# Patient Record
Sex: FEMALE | Race: WHITE | NOT HISPANIC OR LATINO
[De-identification: names, ages, dates, MRNs, and addresses within clinical notes are randomized per-mention and may not be internally consistent; named-entity substitution may affect disease eponyms.]

---

## 2017-03-06 ENCOUNTER — FORM ENCOUNTER (OUTPATIENT)
Age: 67
End: 2017-03-06

## 2017-03-07 ENCOUNTER — FORM ENCOUNTER (OUTPATIENT)
Age: 67
End: 2017-03-07

## 2017-03-28 ENCOUNTER — FORM ENCOUNTER (OUTPATIENT)
Age: 67
End: 2017-03-28

## 2017-04-04 ENCOUNTER — FORM ENCOUNTER (OUTPATIENT)
Age: 67
End: 2017-04-04

## 2017-06-08 ENCOUNTER — FORM ENCOUNTER (OUTPATIENT)
Age: 67
End: 2017-06-08

## 2017-08-03 ENCOUNTER — FORM ENCOUNTER (OUTPATIENT)
Age: 67
End: 2017-08-03

## 2017-08-08 ENCOUNTER — FORM ENCOUNTER (OUTPATIENT)
Age: 67
End: 2017-08-08

## 2017-08-24 ENCOUNTER — FORM ENCOUNTER (OUTPATIENT)
Age: 67
End: 2017-08-24

## 2017-11-08 ENCOUNTER — FORM ENCOUNTER (OUTPATIENT)
Age: 67
End: 2017-11-08

## 2018-03-14 ENCOUNTER — FORM ENCOUNTER (OUTPATIENT)
Age: 68
End: 2018-03-14

## 2018-07-25 PROBLEM — Z00.00 ENCOUNTER FOR PREVENTIVE HEALTH EXAMINATION: Status: ACTIVE | Noted: 2018-07-25

## 2018-07-30 ENCOUNTER — RECORD ABSTRACTING (OUTPATIENT)
Age: 68
End: 2018-07-30

## 2018-07-30 DIAGNOSIS — Z92.89 PERSONAL HISTORY OF OTHER MEDICAL TREATMENT: ICD-10-CM

## 2018-07-30 DIAGNOSIS — Z87.891 PERSONAL HISTORY OF NICOTINE DEPENDENCE: ICD-10-CM

## 2018-07-30 DIAGNOSIS — Z87.19 PERSONAL HISTORY OF OTHER DISEASES OF THE DIGESTIVE SYSTEM: ICD-10-CM

## 2018-07-30 DIAGNOSIS — Z63.4 DISAPPEARANCE AND DEATH OF FAMILY MEMBER: ICD-10-CM

## 2018-07-30 DIAGNOSIS — Z86.19 PERSONAL HISTORY OF OTHER INFECTIOUS AND PARASITIC DISEASES: ICD-10-CM

## 2018-07-30 DIAGNOSIS — Z87.42 PERSONAL HISTORY OF OTHER DISEASES OF THE FEMALE GENITAL TRACT: ICD-10-CM

## 2018-07-30 DIAGNOSIS — J44.9 CHRONIC OBSTRUCTIVE PULMONARY DISEASE, UNSPECIFIED: ICD-10-CM

## 2018-07-30 DIAGNOSIS — Z83.3 FAMILY HISTORY OF DIABETES MELLITUS: ICD-10-CM

## 2018-07-30 DIAGNOSIS — Z98.890 OTHER SPECIFIED POSTPROCEDURAL STATES: ICD-10-CM

## 2018-07-30 DIAGNOSIS — Z86.79 PERSONAL HISTORY OF OTHER DISEASES OF THE CIRCULATORY SYSTEM: ICD-10-CM

## 2018-07-30 DIAGNOSIS — Z78.9 OTHER SPECIFIED HEALTH STATUS: ICD-10-CM

## 2018-07-30 DIAGNOSIS — Z80.49 FAMILY HISTORY OF MALIGNANT NEOPLASM OF OTHER GENITAL ORGANS: ICD-10-CM

## 2018-07-30 RX ORDER — ASPIRIN 81 MG
81 TABLET, DELAYED RELEASE (ENTERIC COATED) ORAL
Refills: 0 | Status: ACTIVE | COMMUNITY

## 2018-07-30 RX ORDER — CLOPIDOGREL 75 MG/1
75 TABLET, FILM COATED ORAL
Refills: 0 | Status: ACTIVE | COMMUNITY

## 2018-07-30 SDOH — SOCIAL STABILITY - SOCIAL INSECURITY: DISSAPEARANCE AND DEATH OF FAMILY MEMBER: Z63.4

## 2018-08-24 ENCOUNTER — APPOINTMENT (OUTPATIENT)
Dept: GYNECOLOGIC ONCOLOGY | Facility: CLINIC | Age: 68
End: 2018-08-24

## 2018-09-20 ENCOUNTER — FORM ENCOUNTER (OUTPATIENT)
Age: 68
End: 2018-09-20

## 2018-09-21 ENCOUNTER — APPOINTMENT (OUTPATIENT)
Dept: GYNECOLOGIC ONCOLOGY | Facility: CLINIC | Age: 68
End: 2018-09-21
Payer: MEDICARE

## 2018-09-21 DIAGNOSIS — N76.2 ACUTE VULVITIS: ICD-10-CM

## 2018-09-21 PROCEDURE — 76857 US EXAM PELVIC LIMITED: CPT | Mod: 59

## 2018-09-21 PROCEDURE — 99214 OFFICE O/P EST MOD 30 MIN: CPT | Mod: 25

## 2019-03-22 ENCOUNTER — APPOINTMENT (OUTPATIENT)
Dept: GYNECOLOGIC ONCOLOGY | Facility: CLINIC | Age: 69
End: 2019-03-22
Payer: MEDICARE

## 2019-03-22 PROCEDURE — 76857 US EXAM PELVIC LIMITED: CPT | Mod: 59

## 2019-03-22 PROCEDURE — 99214 OFFICE O/P EST MOD 30 MIN: CPT | Mod: 25

## 2019-03-27 ENCOUNTER — OTHER (OUTPATIENT)
Age: 69
End: 2019-03-27

## 2019-04-01 NOTE — REASON FOR VISIT
[FreeTextEntry1] : [facility]\par \par [facility] [phone] at [address] [place] [id number]\par

## 2019-04-01 NOTE — END OF VISIT
[FreeTextEntry3] : Written by Yaz Ignacio, acting as a scribe for Dr. Min Fiore.\par This note accurately reflects the work and decision made by me.\par

## 2019-04-01 NOTE — HISTORY OF PRESENT ILLNESS
[FreeTextEntry1] : This 69y/o with a left adnexal mass and h/o vulvitis is here for an ultrasound. Patient feels well from a gynecological stand point. She denies pain or VB. Patient is followed by [facility] for anemia. She has a history of COPD. She had a recent CT chest on 02/06/2019 which revealed irregular spiculated left lower lobe opacity (neoplasm is the leading consideration) as well as a 11mm vague right upper lobe opacity of uncertain etiology. Her ca-125 from 03/2019 was 44 previously 23.

## 2019-04-01 NOTE — PHYSICAL EXAM
[Normal] : No focal neurologic defects observed [Fully active, able to carry on all pre-disease performance without restriction] : Status 0 - Fully active, able to carry on all pre-disease performance without restriction [de-identified] : Yaz Ignacio MA was present the entire time of results and discussion

## 2019-04-10 ENCOUNTER — APPOINTMENT (OUTPATIENT)
Dept: GYNECOLOGIC ONCOLOGY | Facility: CLINIC | Age: 69
End: 2019-04-10
Payer: MEDICARE

## 2019-04-10 PROCEDURE — 99214 OFFICE O/P EST MOD 30 MIN: CPT

## 2019-04-10 RX ORDER — ROSUVASTATIN CALCIUM 10 MG/1
10 TABLET, FILM COATED ORAL
Refills: 0 | Status: DISCONTINUED | COMMUNITY
End: 2019-04-10

## 2019-04-10 NOTE — REASON FOR VISIT
[FreeTextEntry1] : [facility]\par \par [facility] [phone] at [address] [place] [phone]\par

## 2019-04-10 NOTE — PHYSICAL EXAM
[Normal] : No focal neurologic defects observed [Fully active, able to carry on all pre-disease performance without restriction] : Status 0 - Fully active, able to carry on all pre-disease performance without restriction [de-identified] : Yaz Ignacio MA was present the entire time of gynecological exam

## 2019-04-10 NOTE — HISTORY OF PRESENT ILLNESS
[FreeTextEntry1] : This 69y/o with a left adnexal mass was seen in my office on 3/22/19 for an ultrasound. Her ultrasound was stable revealing an 11cm left adnexal cyst. She had a ca-125 drawn in 03/2019 which was elevated at 44, previously 23. She is being worked up for an irregular spiculated left lower lobe opacity as well as an 11mm vague right upper lobe opacity. I sent her for a MILA and MRI and asked her to return today for results. MRI from 3/26/19 revealed a 11x8x7.7 cm simple left pelvic cyst likely ovarian in etiology. No enhancing nodules or septations are seen. As well as colonic diverticulosis. Due to insurance reason patient had a ca-125 drawn instead of MILA. Ca-125 from 4/4/19 was 21.

## 2019-04-10 NOTE — ASSESSMENT
[FreeTextEntry1] : In a study published in November 2018 in [facility], Patel et al. reviewed over 70,000 pelvic ultrasounds in a large unselected population to assess for the risk of ovarian cancer when an ovarian cyst was identified.  In their study, simple cysts were not associated with an increased risk of ovarian cancer.  The authors stated that surveillance of simple cysts likely occurs based on the risk of possibly missing complex features.  However, their data did not support this concern particularly if the cyst was less than 7cm.  They concluded that simple cysts are frequently encountered incidental and normal findings on pelvic imaging and additional evaluation of these findings is not warranted.  I’ve discussed this study with the patient and its strengths and limitations.  We discussed the options of observation vs. no further imaging.\par

## 2019-07-03 ENCOUNTER — APPOINTMENT (OUTPATIENT)
Dept: GYNECOLOGIC ONCOLOGY | Facility: CLINIC | Age: 69
End: 2019-07-03
Payer: MEDICARE

## 2019-07-03 PROCEDURE — 99213 OFFICE O/P EST LOW 20 MIN: CPT | Mod: 25

## 2019-07-03 PROCEDURE — 76857 US EXAM PELVIC LIMITED: CPT | Mod: 59

## 2019-07-11 NOTE — HISTORY OF PRESENT ILLNESS
[FreeTextEntry1] : 67 y/o with an 11 cm left adnexal mass is here today for an US. Ca-125 from 4/4/19 was 21 (previously 44).   Patient is scheduled for a repeat ca-125 next week. She feels well from a gynecological stand point. She denies pain, or abnormal vaginal bleeding.

## 2019-07-11 NOTE — REASON FOR VISIT
[FreeTextEntry1] : [facility]\par \par [facility] [phone] at [address] [place] [id number]\par

## 2019-07-11 NOTE — PHYSICAL EXAM
[Normal] : Mood and affect: Normal [de-identified] : Yaz Ignacio MA was present the entire time of results and discussion  [Restricted in physically strenuous activity but ambulatory and able to carry out work of a light or sedentary nature] : Status 1- Restricted in physically strenuous activity but ambulatory and able to carry out work of a light or sedentary nature, e.g., light house work, office work

## 2019-09-19 ENCOUNTER — APPOINTMENT (OUTPATIENT)
Dept: GYNECOLOGIC ONCOLOGY | Facility: CLINIC | Age: 69
End: 2019-09-19
Payer: MEDICARE

## 2019-09-19 PROCEDURE — 76857 US EXAM PELVIC LIMITED: CPT | Mod: 59

## 2019-09-19 PROCEDURE — 99214 OFFICE O/P EST MOD 30 MIN: CPT | Mod: 25

## 2019-09-23 NOTE — REASON FOR VISIT
[FreeTextEntry1] : [facility]\par \par [facility] [phone] at [address] [place] [phone]\par

## 2019-09-23 NOTE — HISTORY OF PRESENT ILLNESS
[FreeTextEntry1] : 67 y/o with an 11 cm left adnexal mass that I am following conservatively returns today to review recent ca-125 results. Prior ca-125 from March was elevated at 44 followed by a repeat in April that came down to 21.  Ca-125 from 9/11 was 38, Dr. Harvey asked her to return to my office to review.

## 2019-09-23 NOTE — PHYSICAL EXAM
[Normal] : No focal neurologic defects observed [de-identified] : Yaz Ignacio MA was present the entire time of results and discussion

## 2019-09-23 NOTE — END OF VISIT
[FreeTextEntry3] : Written by Yaz Ignacio, acting as a scribe for Dr. Min Fiore.\par This note accurately reflects the work and decisions made by me\par

## 2020-02-12 ENCOUNTER — APPOINTMENT (OUTPATIENT)
Dept: GYNECOLOGIC ONCOLOGY | Facility: CLINIC | Age: 70
End: 2020-02-12
Payer: MEDICARE

## 2020-02-12 VITALS
RESPIRATION RATE: 16 BRPM | OXYGEN SATURATION: 95 % | HEART RATE: 86 BPM | BODY MASS INDEX: 25.87 KG/M2 | HEIGHT: 63 IN | WEIGHT: 146 LBS

## 2020-02-12 PROCEDURE — 76857 US EXAM PELVIC LIMITED: CPT | Mod: 59

## 2020-02-12 PROCEDURE — 99213 OFFICE O/P EST LOW 20 MIN: CPT | Mod: 25

## 2020-02-12 PROCEDURE — 76830 TRANSVAGINAL US NON-OB: CPT | Mod: 59

## 2020-02-12 NOTE — END OF VISIT
[FreeTextEntry3] : Written by Yaz Cruz, acting as a scribe for Dr. Min Fiore.\par This note accurately reflects the work and decisions made by me\par

## 2020-02-12 NOTE — REASON FOR VISIT
[FreeTextEntry1] : [facility]\par \par [facility] [phone] at [address] [place] [phone]\par

## 2020-02-12 NOTE — HISTORY OF PRESENT ILLNESS
[FreeTextEntry1] : 70 y/o with an 11 cm left adnexal mass that I am following conservatively presents today for a follow up ultrasound.  Patient feels well from a gynecological stand point. She denies pain or vaginal bleeding. \par \par Per patient mammogram is UTD

## 2020-02-12 NOTE — ASSESSMENT
[FreeTextEntry1] : Ultrasound from today stable 11 cm simple cyst as well as a small collection of free fluid at the vaginal cuff. \par \par In a study published in November 2018 in [facility], Patel et al. reviewed over 70,000 pelvic ultrasounds in a large unselected population to assess for the risk of ovarian cancer when an ovarian cyst was identified.  In their study, simple cysts were not associated with an increased risk of ovarian cancer.  The authors stated that surveillance of simple cysts likely occurs based on the risk of possibly missing complex features.  However, their data did not support this concern particularly if the cyst was less than 7cm.  They concluded that simple cysts are frequently encountered incidental and normal findings on pelvic imaging and additional evaluation of these findings is not warranted.  I’ve discussed this study with the patient and its strengths and limitations.  We discussed the options of observation vs. no further imaging.

## 2020-02-12 NOTE — PHYSICAL EXAM
[Normal] : No focal neurologic defects observed [de-identified] : Yaz Cruz MA was present the entire time of results and discussion

## 2021-02-10 ENCOUNTER — APPOINTMENT (OUTPATIENT)
Dept: GYNECOLOGIC ONCOLOGY | Facility: CLINIC | Age: 71
End: 2021-02-10

## 2021-04-22 ENCOUNTER — APPOINTMENT (OUTPATIENT)
Dept: GYNECOLOGIC ONCOLOGY | Facility: CLINIC | Age: 71
End: 2021-04-22
Payer: MEDICARE

## 2021-04-22 PROCEDURE — 76830 TRANSVAGINAL US NON-OB: CPT | Mod: 59

## 2021-04-22 PROCEDURE — 93976 VASCULAR STUDY: CPT | Mod: 59

## 2021-04-22 PROCEDURE — 99212 OFFICE O/P EST SF 10 MIN: CPT | Mod: 25

## 2021-04-22 PROCEDURE — 76857 US EXAM PELVIC LIMITED: CPT | Mod: 59

## 2021-04-22 NOTE — PHYSICAL EXAM
[Normal] : No focal neurologic defects observed [de-identified] : Yaz Cruz MA was present the entire time of results and discussion.

## 2021-04-22 NOTE — HISTORY OF PRESENT ILLNESS
[FreeTextEntry1] : 69 y/o female with a simple left adnexal cyst previously measuring 11 cm with stability in size and appearance since 2017 is now seen in my office for an annual US. Patient feels well from a gynecological stand point. She denies pelvic pain or vaginal bleeding. Patient has not seen a routine gyn and is not UTD with well women visits.

## 2021-04-22 NOTE — REASON FOR VISIT
[FreeTextEntry1] : [facility]\par \par [facility] [phone] at [address] [place] [phone]\par

## 2021-04-22 NOTE — ASSESSMENT
[FreeTextEntry1] : Ultrasound from today showed: Left adnexal cyst 9.1 x 6.7 x 6.0 cm w/ debris (previously 11 cm and simple)\par \par 69 y/o female with a simple left adnexal cyst. Patient is asymptomatic. I reviewed her US from today and discussed that it appears the cyst is slightly smaller than previously. I would like to f/u in 6 months with an US.

## 2021-06-17 ENCOUNTER — APPOINTMENT (OUTPATIENT)
Dept: OBGYN | Facility: CLINIC | Age: 71
End: 2021-06-17
Payer: MEDICARE

## 2021-06-17 VITALS — HEIGHT: 63 IN | BODY MASS INDEX: 26.22 KG/M2 | WEIGHT: 148 LBS

## 2021-06-17 VITALS — SYSTOLIC BLOOD PRESSURE: 174 MMHG | DIASTOLIC BLOOD PRESSURE: 84 MMHG

## 2021-06-17 DIAGNOSIS — Z01.419 ENCOUNTER FOR GYNECOLOGICAL EXAMINATION (GENERAL) (ROUTINE) W/OUT ABNORMAL FINDINGS: ICD-10-CM

## 2021-06-17 PROCEDURE — G0101: CPT

## 2021-06-17 NOTE — PLAN
[FreeTextEntry1] : Pap and cultures to lab. SBE encouraged\par mammo slip given\par advised of extreme importance to f/u with her cardiologist regarding her bp. states she has not seen him in years. states she takes her bp meds prn as she can tell when it is elevated. d/w pt risk of not f/u with cards

## 2021-06-17 NOTE — HISTORY OF PRESENT ILLNESS
[FreeTextEntry1] : pt presents for annual doing well w/o c/o. Being followed by Dr. Fiore for ovarian cyst. Pt has not had an annual in approximately 5 years.

## 2021-06-21 LAB — HPV HIGH+LOW RISK DNA PNL CVX: NOT DETECTED

## 2021-07-17 ENCOUNTER — EMERGENCY (EMERGENCY)
Facility: HOSPITAL | Age: 71
LOS: 1 days | End: 2021-07-17
Admitting: EMERGENCY MEDICINE
Payer: MEDICARE

## 2021-07-17 PROCEDURE — 76705 ECHO EXAM OF ABDOMEN: CPT | Mod: 26

## 2021-07-17 PROCEDURE — 99285 EMERGENCY DEPT VISIT HI MDM: CPT

## 2021-07-17 PROCEDURE — 93010 ELECTROCARDIOGRAM REPORT: CPT

## 2021-10-08 ENCOUNTER — APPOINTMENT (OUTPATIENT)
Dept: CARDIOLOGY | Facility: CLINIC | Age: 71
End: 2021-10-08
Payer: MEDICARE

## 2021-10-08 ENCOUNTER — NON-APPOINTMENT (OUTPATIENT)
Age: 71
End: 2021-10-08

## 2021-10-08 VITALS
WEIGHT: 143 LBS | HEART RATE: 87 BPM | TEMPERATURE: 97.7 F | DIASTOLIC BLOOD PRESSURE: 72 MMHG | HEIGHT: 63 IN | SYSTOLIC BLOOD PRESSURE: 126 MMHG | BODY MASS INDEX: 25.34 KG/M2 | OXYGEN SATURATION: 96 %

## 2021-10-08 VITALS — SYSTOLIC BLOOD PRESSURE: 120 MMHG | DIASTOLIC BLOOD PRESSURE: 72 MMHG

## 2021-10-08 PROCEDURE — 93000 ELECTROCARDIOGRAM COMPLETE: CPT

## 2021-10-08 PROCEDURE — 99204 OFFICE O/P NEW MOD 45 MIN: CPT | Mod: 25

## 2021-10-08 RX ORDER — FLUCONAZOLE 150 MG/1
150 TABLET ORAL
Refills: 0 | Status: DISCONTINUED | COMMUNITY
End: 2021-10-08

## 2021-10-11 ENCOUNTER — APPOINTMENT (OUTPATIENT)
Dept: CARDIOLOGY | Facility: CLINIC | Age: 71
End: 2021-10-11
Payer: MEDICARE

## 2021-10-11 PROCEDURE — 93306 TTE W/DOPPLER COMPLETE: CPT

## 2021-10-12 ENCOUNTER — NON-APPOINTMENT (OUTPATIENT)
Age: 71
End: 2021-10-12

## 2021-10-12 ENCOUNTER — APPOINTMENT (OUTPATIENT)
Dept: CARDIOLOGY | Facility: CLINIC | Age: 71
End: 2021-10-12

## 2021-10-12 NOTE — REVIEW OF SYSTEMS
[Joint Pain] : joint pain [Joint Stiffness] : joint stiffness [Negative] : Neurological [FreeTextEntry5] : see HPI [FreeTextEntry6] : see HPI

## 2021-10-12 NOTE — PHYSICAL EXAM
[Normal S1, S2] : normal S1, S2 [Murmur] : murmur [Normal] : moves all extremities, no focal deficits, normal speech [de-identified] : No JVD, no carotid artery bruits auscultated bilaterally [de-identified] : faint systolic murmur at base of heart.

## 2021-10-12 NOTE — CARDIOLOGY SUMMARY
[de-identified] : 10/8/2021, NSR with LUZ, LVH with right axis deviation and non-specific ST changes.

## 2021-10-12 NOTE — HISTORY OF PRESENT ILLNESS
[FreeTextEntry1] : 71 year old female, former smoker, hx of COPD, HTN, carotid artery disease s/p bilateral CEA in the distant past, presents for cardiac clearance prior to resuming pulmonary rehab. Patient did a couple of years ago and stopped because of COVID pandemic. She felt really good doing it and wants to return. She has stable dyspnea on exertion from her COPD. No chest pain or palpitations. \par \par There is no history of MI, CVA, CHF, or previous coronary intervention.\par

## 2021-10-12 NOTE — ADDENDUM
[FreeTextEntry1] : Patient underwent echocardiogram 10/11/2021 in our office. Normal LV systolic function with visually estimated LV EF of 55-60%. No significant valvular disease. \par \par Patient may proceed with pulmonary rehab from a cardiac standpoint.

## 2021-10-12 NOTE — DISCUSSION/SUMMARY
[FreeTextEntry1] : 1. Carotid Artery Disease: s/p bilateral CEA in the distant past. On Plavix 75mg daily. Would recommend statin therapy but will defer to PCP and/or vascular specialist, for whom patient still follows. \par \par 2. HTN: appears controlled. Recommend continuing lisinopril 10mg daily and metoprolol 25mg daily. \par \par 3. Abnormal ECG: recommend echocardiogram. Once reviewed I can make further recommendations regarding pulmonary rehab for this patient. \par \par Follow up in 1 year.

## 2021-10-20 PROBLEM — N94.89 ADNEXAL MASS: Status: ACTIVE | Noted: 2018-09-21

## 2021-10-21 ENCOUNTER — APPOINTMENT (OUTPATIENT)
Dept: GYNECOLOGIC ONCOLOGY | Facility: CLINIC | Age: 71
End: 2021-10-21

## 2021-10-21 DIAGNOSIS — N94.89 OTHER SPECIFIED CONDITIONS ASSOCIATED WITH FEMALE GENITAL ORGANS AND MENSTRUAL CYCLE: ICD-10-CM

## 2021-10-28 ENCOUNTER — APPOINTMENT (OUTPATIENT)
Dept: CARDIOLOGY | Facility: CLINIC | Age: 71
End: 2021-10-28
Payer: MEDICARE

## 2021-10-28 VITALS
BODY MASS INDEX: 25.34 KG/M2 | HEART RATE: 80 BPM | SYSTOLIC BLOOD PRESSURE: 216 MMHG | DIASTOLIC BLOOD PRESSURE: 100 MMHG | WEIGHT: 143 LBS | OXYGEN SATURATION: 99 % | HEIGHT: 63 IN

## 2021-10-28 PROCEDURE — 99214 OFFICE O/P EST MOD 30 MIN: CPT

## 2021-10-28 RX ORDER — LISINOPRIL 10 MG/1
10 TABLET ORAL
Refills: 0 | Status: DISCONTINUED | COMMUNITY
End: 2021-10-28

## 2021-10-28 RX ORDER — METOPROLOL TARTRATE 25 MG/1
25 TABLET, FILM COATED ORAL
Refills: 0 | Status: DISCONTINUED | COMMUNITY
End: 2021-10-28

## 2021-10-28 NOTE — DISCUSSION/SUMMARY
[FreeTextEntry1] : 1. Carotid Artery Disease: s/p bilateral CEA in the distant past. On Plavix 75mg daily and Aspirin 81mg daily. Would recommend statin therapy but will defer to PCP and/or vascular specialist, for whom patient still follows. \par \par 2. HTN: discussed importance of medication compliance. Discussed the fact that she should notify our office of any BP swings to determine best course of action and not take it upon herself to manage her BP medications. Recommend discontinuing Metoprolol. Will increase dose of Lisinopril to 20mg daily.\par \par 3. Abnormal ECG: Echocardiogram showed no evidence of structural heart disease.\par \par Follow up in 1 week.

## 2021-10-28 NOTE — HISTORY OF PRESENT ILLNESS
[FreeTextEntry1] : Historical Perspective:\par 71 year old female, former smoker, hx of COPD, HTN, carotid artery disease s/p bilateral CEA in the distant past, presents for cardiac clearance prior to resuming pulmonary rehab. Patient did a couple of years ago and stopped because of COVID pandemic. She felt really good doing it and wants to return. She has stable dyspnea on exertion from her COPD. No chest pain or palpitations. \par \par There is no history of MI, CVA, CHF, or previous coronary intervention.\par \par Current Health Status:\par Since seeing me last patient took it upon herself to stop her BP medications without notifying our office because she states her BP was running low and it was making her fatigued. She states it was 90/50 at times. Went to pulmonary rehab Tuesday and BP was over 200mmHg systolic. Patient has no chest pain or palpitations. No headaches. Stable SOB.\par

## 2021-10-28 NOTE — PHYSICAL EXAM
[Normal S1, S2] : normal S1, S2 [Murmur] : murmur [Normal] : moves all extremities, no focal deficits, normal speech [de-identified] : No JVD, no carotid artery bruits auscultated bilaterally [de-identified] : faint systolic murmur at base of heart.

## 2021-10-28 NOTE — CARDIOLOGY SUMMARY
[de-identified] : 10/8/2021, NSR with LUZ, LVH with right axis deviation and non-specific ST changes. [de-identified] : 10/11/2021, LV EF 60-65%, trace MR, mild TR with estimated PASP of 30mmHg.

## 2021-11-04 ENCOUNTER — APPOINTMENT (OUTPATIENT)
Dept: CARDIOLOGY | Facility: CLINIC | Age: 71
End: 2021-11-04
Payer: MEDICARE

## 2021-11-04 VITALS
DIASTOLIC BLOOD PRESSURE: 90 MMHG | HEIGHT: 63 IN | HEART RATE: 82 BPM | WEIGHT: 143 LBS | OXYGEN SATURATION: 94 % | BODY MASS INDEX: 25.34 KG/M2 | SYSTOLIC BLOOD PRESSURE: 170 MMHG

## 2021-11-04 PROCEDURE — 99214 OFFICE O/P EST MOD 30 MIN: CPT

## 2021-11-04 NOTE — HISTORY OF PRESENT ILLNESS
[FreeTextEntry1] : Historical Perspective:\par 71 year old female, former smoker, hx of COPD, HTN, carotid artery disease s/p bilateral CEA in the distant past, presents for cardiac clearance prior to resuming pulmonary rehab. Patient did a couple of years ago and stopped because of COVID pandemic. She felt really good doing it and wants to return. She has stable dyspnea on exertion from her COPD. No chest pain or palpitations. \par \par There is no history of MI, CVA, CHF, or previous coronary intervention.\par \par Patient took it upon herself to stop her BP medications without notifying our office because she states her BP was running low and it was making her fatigued. She states it was 90/50 at times. Went to pulmonary rehab Tuesday and BP was over 200mmHg systolic. Patient has no chest pain or palpitations. No headaches. Stable SOB.\par \par Current Health Status:\par BP elevated this morning because patient did not take her lisinopril yet. Patient states since restarting the lisinopril, however, her BP has been well controlled ranging 120-130mmhg systolic at home.\par

## 2021-11-04 NOTE — DISCUSSION/SUMMARY
[FreeTextEntry1] : 1. Carotid Artery Disease: s/p bilateral CEA in the distant past. On Plavix 75mg daily and Aspirin 81mg daily. Recommend starting rosuvastatin 20mg daily with goal LDL less than 70.\par \par 2. HTN: discussed importance of medication compliance. Discussed the fact that she should notify our office of any BP swings to determine best course of action and not take it upon herself to manage her BP medications. Recommended discontinuing Metoprolol. Continue Lisinopril to 20mg daily. Goal BP less than 130/80.\par \par 3. Abnormal ECG: Echocardiogram showed no evidence of structural heart disease.\par \par 4. HLD: goal LDL less than 70. Started rosuvastatin 20mg daily.\par \par Follow up in 6 months.

## 2021-11-04 NOTE — PHYSICAL EXAM
[Normal S1, S2] : normal S1, S2 [Murmur] : murmur [Normal] : moves all extremities, no focal deficits, normal speech [de-identified] : No JVD, no carotid artery bruits auscultated bilaterally [de-identified] : faint systolic murmur at base of heart.

## 2022-03-17 ENCOUNTER — NON-APPOINTMENT (OUTPATIENT)
Age: 72
End: 2022-03-17

## 2022-04-22 ENCOUNTER — APPOINTMENT (OUTPATIENT)
Dept: CARDIOLOGY | Facility: CLINIC | Age: 72
End: 2022-04-22

## 2022-05-21 ENCOUNTER — OUTPATIENT (OUTPATIENT)
Dept: OUTPATIENT SERVICES | Facility: HOSPITAL | Age: 72
LOS: 1 days | End: 2022-05-21

## 2022-05-23 DIAGNOSIS — Z20.828 CONTACT WITH AND (SUSPECTED) EXPOSURE TO OTHER VIRAL COMMUNICABLE DISEASES: ICD-10-CM

## 2022-05-24 ENCOUNTER — APPOINTMENT (OUTPATIENT)
Dept: CARDIOLOGY | Facility: CLINIC | Age: 72
End: 2022-05-24

## 2022-05-25 ENCOUNTER — APPOINTMENT (OUTPATIENT)
Dept: CARDIOLOGY | Facility: CLINIC | Age: 72
End: 2022-05-25
Payer: MEDICARE

## 2022-05-25 ENCOUNTER — NON-APPOINTMENT (OUTPATIENT)
Age: 72
End: 2022-05-25

## 2022-05-25 VITALS
HEART RATE: 86 BPM | SYSTOLIC BLOOD PRESSURE: 216 MMHG | OXYGEN SATURATION: 97 % | HEIGHT: 63 IN | DIASTOLIC BLOOD PRESSURE: 110 MMHG | WEIGHT: 141 LBS | BODY MASS INDEX: 24.98 KG/M2

## 2022-05-25 PROCEDURE — 99214 OFFICE O/P EST MOD 30 MIN: CPT | Mod: 25

## 2022-05-25 PROCEDURE — 93000 ELECTROCARDIOGRAM COMPLETE: CPT

## 2022-05-25 NOTE — HISTORY OF PRESENT ILLNESS
[FreeTextEntry1] : Historical Perspective:\par 71 year old female, former smoker, hx of COPD, HTN, carotid artery disease s/p bilateral CEA in the distant past, presents for cardiac clearance prior to resuming pulmonary rehab. Patient did a couple of years ago and stopped because of COVID pandemic. She felt really good doing it and wants to return. She has stable dyspnea on exertion from her COPD. No chest pain or palpitations. \par \par There is no history of MI, CVA, CHF, or previous coronary intervention.\par \par Patient took it upon herself to stop her BP medications without notifying our office because she states her BP was running low and it was making her fatigued. She states it was 90/50 at times. Went to pulmonary rehab Tuesday and BP was over 200mmHg systolic. Patient has no chest pain or palpitations. No headaches. Stable SOB.\par \par Current Health Status:\par BP elevated this morning because patient did not take her lisinopril yet. Patient states her BP has been well controlled ranging 120-130mmhg systolic at home. She just had cataract surgery and states her BP was well controlled at the procedure. Now taking prednisone for a sinus infection.\par

## 2022-05-25 NOTE — DISCUSSION/SUMMARY
[FreeTextEntry1] : 1. Carotid Artery Disease: s/p bilateral CEA in the distant past. On Plavix 75mg daily and Aspirin 81mg daily. Recommend continuing rosuvastatin 20mg daily with goal LDL less than 70. Follows with vascular surgeon.\par \par 2. HTN: discussed importance of medication compliance. Continue Lisinopril 30mg daily. Goal BP less than 130/80. Recommend low salt diet.\par \par 3. Abnormal ECG: Echocardiogram showed no evidence of structural heart disease.\par \par 4. HLD: goal LDL less than 70. Started rosuvastatin 20mg daily.\par \par Follow up in 6 months.

## 2022-05-25 NOTE — CARDIOLOGY SUMMARY
[de-identified] : 5/25/2022, NSR with LUZ, LVH with right axis deviation and non-specific ST changes. [de-identified] : 10/11/2021, LV EF 60-65%, trace MR, mild TR with estimated PASP of 30mmHg.

## 2022-05-25 NOTE — PHYSICAL EXAM
[Normal S1, S2] : normal S1, S2 [Murmur] : murmur [Normal] : moves all extremities, no focal deficits, normal speech [de-identified] : No carotid artery bruits auscultated bilaterally [de-identified] : faint systolic murmur at base of heart.

## 2022-09-30 NOTE — CARDIOLOGY SUMMARY
[de-identified] : 10/8/2021, NSR with LUZ, LVH with right axis deviation and non-specific ST changes. [de-identified] : 10/11/2021, LV EF 60-65%, trace MR, mild TR with estimated PASP of 30mmHg. [phone]

## 2022-10-04 ENCOUNTER — APPOINTMENT (OUTPATIENT)
Dept: CARDIOLOGY | Facility: CLINIC | Age: 72
End: 2022-10-04

## 2022-10-04 VITALS
SYSTOLIC BLOOD PRESSURE: 166 MMHG | WEIGHT: 141 LBS | HEIGHT: 63 IN | OXYGEN SATURATION: 95 % | BODY MASS INDEX: 24.98 KG/M2 | HEART RATE: 75 BPM | DIASTOLIC BLOOD PRESSURE: 96 MMHG

## 2022-10-04 PROCEDURE — 99214 OFFICE O/P EST MOD 30 MIN: CPT

## 2022-10-04 RX ORDER — TIOTROPIUM BROMIDE 18 UG/1
CAPSULE ORAL; RESPIRATORY (INHALATION)
Refills: 0 | Status: DISCONTINUED | COMMUNITY
End: 2022-10-04

## 2022-10-04 RX ORDER — PREDNISONE 20 MG/1
20 TABLET ORAL
Qty: 8 | Refills: 0 | Status: DISCONTINUED | COMMUNITY
Start: 2022-05-21 | End: 2022-10-04

## 2022-10-04 RX ORDER — DICYCLOMINE HYDROCHLORIDE 20 MG/1
20 TABLET ORAL 4 TIMES DAILY
Refills: 0 | Status: ACTIVE | COMMUNITY

## 2022-10-04 RX ORDER — LISINOPRIL 30 MG/1
30 TABLET ORAL DAILY
Qty: 90 | Refills: 3 | Status: DISCONTINUED | COMMUNITY
Start: 2021-10-28 | End: 2022-10-04

## 2022-10-04 RX ORDER — LEVOFLOXACIN 500 MG/1
500 TABLET, FILM COATED ORAL
Qty: 8 | Refills: 0 | Status: DISCONTINUED | COMMUNITY
Start: 2021-12-20 | End: 2022-10-04

## 2022-10-04 RX ORDER — FLUTICASONE FUROATE, UMECLIDINIUM BROMIDE AND VILANTEROL TRIFENATATE 100; 62.5; 25 UG/1; UG/1; UG/1
100-62.5-25 POWDER RESPIRATORY (INHALATION)
Refills: 0 | Status: ACTIVE | COMMUNITY

## 2022-10-04 RX ORDER — FLUTICASONE PROPIONATE AND SALMETEROL 50; 250 UG/1; UG/1
250-50 POWDER RESPIRATORY (INHALATION)
Refills: 0 | Status: DISCONTINUED | COMMUNITY
End: 2022-10-04

## 2022-10-04 RX ORDER — ROSUVASTATIN CALCIUM 20 MG/1
20 TABLET, FILM COATED ORAL
Qty: 90 | Refills: 3 | Status: ACTIVE | COMMUNITY
Start: 2021-11-04 | End: 1900-01-01

## 2022-10-04 NOTE — PHYSICAL EXAM
[Normal S1, S2] : normal S1, S2 [Murmur] : murmur [Normal] : moves all extremities, no focal deficits, normal speech [de-identified] : Right carotid bruit auscultated. [de-identified] : faint systolic murmur at base of heart.

## 2022-10-04 NOTE — CARDIOLOGY SUMMARY
[de-identified] : 5/25/2022, NSR with LUZ, LVH with right axis deviation and non-specific ST changes. [de-identified] : 10/11/2021, LV EF 60-65%, trace MR, mild TR with estimated PASP of 30mmHg.

## 2022-10-04 NOTE — DISCUSSION/SUMMARY
[FreeTextEntry1] : 1. Carotid Artery Disease: s/p bilateral CEA in the distant past. On Plavix 75mg daily and Aspirin 81mg daily. Recommend continuing rosuvastatin 20mg daily with goal LDL less than 70. Follows with vascular surgeon and states had recent imaging end of summer 2022 and everything was stable. \par \par 2. HTN: discussed importance of medication compliance. Recommend discoontinuing Lisinopril 30mg daily. Start losartan/HCTZ 100/12.5mg daily. Goal BP less than 130/80. Recommend low salt diet.\par \par 3. Abnormal ECG: Echocardiogram showed no evidence of structural heart disease.\par \par 4. HLD: goal LDL less than 70. Continue rosuvastatin 20mg daily.\par \par Follow up in 2 weeks

## 2022-10-04 NOTE — HISTORY OF PRESENT ILLNESS
[FreeTextEntry1] : Historical Perspective:\par 71 year old female, former smoker, hx of COPD, HTN, carotid artery disease s/p bilateral CEA in the distant past, presents for cardiac clearance prior to resuming pulmonary rehab. Patient did a couple of years ago and stopped because of COVID pandemic. She felt really good doing it and wants to return. She has stable dyspnea on exertion from her COPD. No chest pain or palpitations. \par \par There is no history of MI, CVA, CHF, or previous coronary intervention.\par \par Patient took it upon herself to stop her BP medications without notifying our office because she states her BP was running low and it was making her fatigued. She states it was 90/50 at times. Went to pulmonary rehab Tuesday and BP was over 200mmHg systolic. Patient has no chest pain or palpitations. No headaches. Stable SOB.\par \par Current Health Status:\par Patient states since the weather got colder over the past few days her BP has been running consistently above 130/80 at home. No chest pain or worsening dyspnea. Today she took two doses of her lisinopril 30mg. Her BP remains elevated. \par

## 2022-10-11 ENCOUNTER — NON-APPOINTMENT (OUTPATIENT)
Age: 72
End: 2022-10-11

## 2022-10-11 RX ORDER — LOSARTAN POTASSIUM AND HYDROCHLOROTHIAZIDE 12.5; 1 MG/1; MG/1
100-12.5 TABLET ORAL
Qty: 90 | Refills: 3 | Status: DISCONTINUED | COMMUNITY
Start: 2022-10-04 | End: 2022-10-11

## 2022-10-25 ENCOUNTER — APPOINTMENT (OUTPATIENT)
Dept: CARDIOLOGY | Facility: CLINIC | Age: 72
End: 2022-10-25

## 2022-10-25 VITALS
HEART RATE: 76 BPM | WEIGHT: 141 LBS | BODY MASS INDEX: 24.98 KG/M2 | HEIGHT: 63 IN | OXYGEN SATURATION: 93 % | SYSTOLIC BLOOD PRESSURE: 118 MMHG | DIASTOLIC BLOOD PRESSURE: 62 MMHG

## 2022-10-25 PROCEDURE — 99214 OFFICE O/P EST MOD 30 MIN: CPT

## 2022-10-25 NOTE — CARDIOLOGY SUMMARY
[de-identified] : 5/25/2022, NSR with LUZ, LVH with right axis deviation and non-specific ST changes. [de-identified] : 10/11/2021, LV EF 60-65%, trace MR, mild TR with estimated PASP of 30mmHg.

## 2022-10-25 NOTE — HISTORY OF PRESENT ILLNESS
[FreeTextEntry1] : Historical Perspective:\par 71 year old female, former smoker, hx of COPD, HTN, carotid artery disease s/p bilateral CEA in the distant past, presents for cardiac clearance prior to resuming pulmonary rehab. Patient did a couple of years ago and stopped because of COVID pandemic. She felt really good doing it and wants to return. She has stable dyspnea on exertion from her COPD. No chest pain or palpitations. \par \par There is no history of MI, CVA, CHF, or previous coronary intervention.\par \par Patient took it upon herself to stop her BP medications without notifying our office because she states her BP was running low and it was making her fatigued. She states it was 90/50 at times. Went to pulmonary rehab Tuesday and BP was over 200mmHg systolic. Patient has no chest pain or palpitations. No headaches. Stable SOB.\par \par Current Health Status:\par Patient was unable to tolerate losartan/HCTZ as she developed a rash. We discontinued the medication and increased her lisinopril from 30mg--40mg daily, instead. Tolerating well with no adverse effects. BP has been well controlled at home.

## 2022-10-25 NOTE — DISCUSSION/SUMMARY
[FreeTextEntry1] : 1. Carotid Artery Disease: s/p bilateral CEA in the distant past. On Plavix 75mg daily and Aspirin 81mg daily. Recommend continuing rosuvastatin 20mg daily with goal LDL less than 70. Follows with vascular surgeon and states had recent imaging end of summer 2022 and everything was stable. \par \par 2. HTN: discussed importance of medication compliance. Patient was unable to tolerate losartan/HCTZ as she developed a rash. We discontinued the medication and increased her lisinopril from 30mg--40mg daily, instead. Tolerating well with no adverse effects. BP has been well controlled at home. Goal BP less than 130/80. Recommend low salt diet.\par \par 3. Abnormal ECG: Echocardiogram showed no evidence of structural heart disease.\par \par 4. HLD: goal LDL less than 70. Continue rosuvastatin 20mg daily.\par \par Follow up in 6 months.

## 2022-11-23 ENCOUNTER — APPOINTMENT (OUTPATIENT)
Dept: CARDIOLOGY | Facility: CLINIC | Age: 72
End: 2022-11-23

## 2022-11-23 ENCOUNTER — NON-APPOINTMENT (OUTPATIENT)
Age: 72
End: 2022-11-23

## 2022-11-23 VITALS
DIASTOLIC BLOOD PRESSURE: 72 MMHG | WEIGHT: 141 LBS | OXYGEN SATURATION: 92 % | BODY MASS INDEX: 24.98 KG/M2 | HEART RATE: 80 BPM | SYSTOLIC BLOOD PRESSURE: 112 MMHG | HEIGHT: 63 IN

## 2022-11-23 PROCEDURE — 93000 ELECTROCARDIOGRAM COMPLETE: CPT

## 2022-11-23 PROCEDURE — 99214 OFFICE O/P EST MOD 30 MIN: CPT

## 2022-11-23 NOTE — HISTORY OF PRESENT ILLNESS
[FreeTextEntry1] : Historical Perspective:\par 72 year old female, former smoker, hx of COPD, HTN, carotid artery disease s/p bilateral CEA in the distant past, presents for cardiac clearance prior to resuming pulmonary rehab. Patient did a couple of years ago and stopped because of COVID pandemic. She felt really good doing it and wants to return. She has stable dyspnea on exertion from her COPD. No chest pain or palpitations. \par \par There is no history of MI, CVA, CHF, or previous coronary intervention.\par \par Patient took it upon herself to stop her BP medications without notifying our office because she states her BP was running low and it was making her fatigued. She states it was 90/50 at times. Went to pulmonary rehab Tuesday and BP was over 200mmHg systolic. Patient has no chest pain or palpitations. No headaches. Stable SOB.\par \par Current Health Status:\par Patient was unable to tolerate losartan/HCTZ as she developed a rash. We discontinued the medication and increased her lisinopril from 30mg--40mg daily, instead. Tolerating well with no adverse effects. BP has been well controlled at home.

## 2022-11-23 NOTE — CARDIOLOGY SUMMARY
[de-identified] : 11/23/2022, NSR with PAC [de-identified] : 10/11/2021, LV EF 60-65%, trace MR, mild TR with estimated PASP of 30mmHg.

## 2022-11-23 NOTE — DISCUSSION/SUMMARY
[FreeTextEntry1] : 1. Carotid Artery Disease: s/p bilateral CEA in the distant past. On Plavix 75mg daily and Aspirin 81mg daily. Recommend continuing rosuvastatin 20mg daily with goal LDL less than 70. Follows with vascular surgeon and states had recent imaging end of summer 2022 and everything was stable. \par \par 2. HTN: discussed importance of medication compliance. Patient was unable to tolerate losartan/HCTZ as she developed a rash. We discontinued the medication and increased her lisinopril from 30mg--40mg daily, instead. Tolerating well with no adverse effects. BP has been well controlled at home. Goal BP less than 130/80. Recommend low salt diet.\par \par 3. Abnormal ECG: Echocardiogram showed no evidence of structural heart disease.\par \par 4. HLD: goal LDL less than 70. Continue rosuvastatin 20mg daily.\par \par Follow up in 6 months. [EKG obtained to assist in diagnosis and management of assessed problem(s)] : EKG obtained to assist in diagnosis and management of assessed problem(s)

## 2022-11-23 NOTE — PHYSICAL EXAM
[Normal S1, S2] : normal S1, S2 [Murmur] : murmur [Normal] : moves all extremities, no focal deficits, normal speech [de-identified] : Right carotid bruit auscultated. [de-identified] : faint systolic murmur at base of heart.

## 2023-04-18 ENCOUNTER — APPOINTMENT (OUTPATIENT)
Dept: CARDIOLOGY | Facility: CLINIC | Age: 73
End: 2023-04-18
Payer: MEDICARE

## 2023-04-18 VITALS
HEART RATE: 99 BPM | OXYGEN SATURATION: 90 % | HEIGHT: 63 IN | SYSTOLIC BLOOD PRESSURE: 112 MMHG | DIASTOLIC BLOOD PRESSURE: 60 MMHG | BODY MASS INDEX: 24.8 KG/M2 | WEIGHT: 140 LBS

## 2023-04-18 PROCEDURE — 99214 OFFICE O/P EST MOD 30 MIN: CPT

## 2023-04-18 NOTE — PHYSICAL EXAM
[Normal S1, S2] : normal S1, S2 [Murmur] : murmur [Normal] : moves all extremities, no focal deficits, normal speech [de-identified] : Right carotid bruit auscultated. [de-identified] : faint systolic murmur at base of heart.

## 2023-04-18 NOTE — CARDIOLOGY SUMMARY
[de-identified] : 11/23/2022, NSR with PAC [de-identified] : 10/11/2021, LV EF 60-65%, trace MR, mild TR with estimated PASP of 30mmHg.

## 2023-07-20 ENCOUNTER — NON-APPOINTMENT (OUTPATIENT)
Age: 73
End: 2023-07-20

## 2023-10-18 ENCOUNTER — APPOINTMENT (OUTPATIENT)
Dept: CARDIOLOGY | Facility: CLINIC | Age: 73
End: 2023-10-18
Payer: MEDICARE

## 2023-10-18 ENCOUNTER — NON-APPOINTMENT (OUTPATIENT)
Age: 73
End: 2023-10-18

## 2023-10-18 VITALS
WEIGHT: 147 LBS | SYSTOLIC BLOOD PRESSURE: 180 MMHG | HEART RATE: 97 BPM | HEIGHT: 63 IN | DIASTOLIC BLOOD PRESSURE: 90 MMHG | OXYGEN SATURATION: 91 % | BODY MASS INDEX: 26.05 KG/M2

## 2023-10-18 DIAGNOSIS — R94.31 ABNORMAL ELECTROCARDIOGRAM [ECG] [EKG]: ICD-10-CM

## 2023-10-18 PROCEDURE — 99214 OFFICE O/P EST MOD 30 MIN: CPT

## 2023-10-18 PROCEDURE — 93000 ELECTROCARDIOGRAM COMPLETE: CPT

## 2023-10-18 RX ORDER — LISINOPRIL 40 MG/1
40 TABLET ORAL DAILY
Qty: 90 | Refills: 0 | Status: ACTIVE | COMMUNITY
Start: 2023-10-18

## 2023-10-18 RX ORDER — LOSARTAN POTASSIUM 50 MG/1
50 TABLET, FILM COATED ORAL
Qty: 180 | Refills: 0 | Status: DISCONTINUED | COMMUNITY
Start: 1900-01-01 | End: 2023-10-18

## 2023-11-06 ENCOUNTER — APPOINTMENT (OUTPATIENT)
Dept: CARDIOLOGY | Facility: CLINIC | Age: 73
End: 2023-11-06

## 2023-11-29 ENCOUNTER — APPOINTMENT (OUTPATIENT)
Dept: CARDIOLOGY | Facility: CLINIC | Age: 73
End: 2023-11-29

## 2024-01-31 ENCOUNTER — APPOINTMENT (OUTPATIENT)
Dept: CARDIOLOGY | Facility: CLINIC | Age: 74
End: 2024-01-31
Payer: MEDICARE

## 2024-01-31 VITALS
WEIGHT: 145 LBS | BODY MASS INDEX: 25.69 KG/M2 | HEART RATE: 97 BPM | HEIGHT: 63 IN | SYSTOLIC BLOOD PRESSURE: 196 MMHG | DIASTOLIC BLOOD PRESSURE: 92 MMHG | OXYGEN SATURATION: 93 %

## 2024-01-31 DIAGNOSIS — E78.00 PURE HYPERCHOLESTEROLEMIA, UNSPECIFIED: ICD-10-CM

## 2024-01-31 DIAGNOSIS — I10 ESSENTIAL (PRIMARY) HYPERTENSION: ICD-10-CM

## 2024-01-31 DIAGNOSIS — I34.0 NONRHEUMATIC MITRAL (VALVE) INSUFFICIENCY: ICD-10-CM

## 2024-01-31 DIAGNOSIS — I77.9 DISORDER OF ARTERIES AND ARTERIOLES, UNSPECIFIED: ICD-10-CM

## 2024-01-31 PROCEDURE — 93306 TTE W/DOPPLER COMPLETE: CPT

## 2024-01-31 PROCEDURE — 99214 OFFICE O/P EST MOD 30 MIN: CPT

## 2024-01-31 PROCEDURE — G2211 COMPLEX E/M VISIT ADD ON: CPT

## 2024-01-31 RX ORDER — NIFEDIPINE 30 MG/1
30 TABLET, EXTENDED RELEASE ORAL
Qty: 90 | Refills: 3 | Status: ACTIVE | COMMUNITY
Start: 2024-01-31 | End: 1900-01-01

## 2024-01-31 NOTE — HISTORY OF PRESENT ILLNESS
[FreeTextEntry1] : Historical Perspective: 72 year old female, former smoker, hx of COPD, HTN, carotid artery disease s/p bilateral CEA in the distant past, presents for cardiac clearance prior to resuming pulmonary rehab. Patient did a couple of years ago and stopped because of COVID pandemic. She felt really good doing it and wants to return. She has stable dyspnea on exertion from her COPD. No chest pain or palpitations.   There is no history of MI, CVA, CHF, or previous coronary intervention.  Patient took it upon herself to stop her BP medications without notifying our office because she states her BP was running low and it was making her fatigued. She states it was 90/50 at times. Went to pulmonary rehab Tuesday and BP was over 200mmHg systolic. Patient has no chest pain or palpitations. No headaches. Stable SOB.  Current Health Status: Patient was unable to tolerate losartan/HCTZ as she developed a rash. We discontinued the medication and increased her lisinopril from 30mg--40mg daily, instead.  BP elevated today. Patient states she did not take her medication this morning.

## 2024-01-31 NOTE — CARDIOLOGY SUMMARY
[de-identified] : 10/18/2023, NSR with poor R wave progression and non-specific ST changes, possible LUZ 11/23/2022, NSR with PAC [de-identified] : 1/31/2024, LV EF 65%, moderate MR, trace AI, mild TR with estimated PASP of 28mmHg. 10/11/2021, LV EF 60-65%, trace MR, mild TR with estimated PASP of 30mmHg.

## 2024-01-31 NOTE — DISCUSSION/SUMMARY
[FreeTextEntry1] : 1. Carotid Artery Disease: s/p bilateral CEA in the distant past. On Plavix 75mg daily and Aspirin 81mg daily. Recommend continuing rosuvastatin 20mg daily with goal LDL less than 70. Follows with vascular surgeon and states had recent imaging end of summer 2022 and everything was stable.   2. HTN: discussed importance of medication compliance. Patient was unable to tolerate losartan/HCTZ as she developed a rash. We discontinued the medication and increased her lisinopril from 30mg--40mg daily, instead. Tolerating well with no adverse effects.  Goal BP less than 130/80. Recommend low salt diet. Recommend adding Nifedipine ER 30mg daily.  3. Abnormal ECG: Echocardiogram showed no evidence of structural heart disease.  4. HLD: goal LDL less than 70. Continue rosuvastatin 20mg daily.  5. Mitral Regurgitation: increased to moderate on echocardiogram from 1/31/2024, however, SBP was in the 190s. Discussed with patient importance of adequate BP control to less than 130/80. Periodic echo surveillance.   Follow up in 3-4 weeks for BP check.

## 2024-01-31 NOTE — PHYSICAL EXAM
[Normal S1, S2] : normal S1, S2 [Murmur] : murmur [Normal] : moves all extremities, no focal deficits, normal speech [de-identified] : Right carotid bruit auscultated. [de-identified] : faint systolic murmur at base of heart.

## 2024-02-21 ENCOUNTER — APPOINTMENT (OUTPATIENT)
Dept: CARDIOLOGY | Facility: CLINIC | Age: 74
End: 2024-02-21

## 2024-05-15 ENCOUNTER — RX RENEWAL (OUTPATIENT)
Age: 74
End: 2024-05-15

## 2024-05-15 RX ORDER — LISINOPRIL 40 MG/1
40 TABLET ORAL DAILY
Qty: 90 | Refills: 3 | Status: ACTIVE | COMMUNITY
Start: 2022-10-11 | End: 1900-01-01

## 2025-06-12 ENCOUNTER — APPOINTMENT (OUTPATIENT)
Dept: CARDIOLOGY | Facility: CLINIC | Age: 75
End: 2025-06-12
Payer: MEDICARE

## 2025-06-12 VITALS
SYSTOLIC BLOOD PRESSURE: 184 MMHG | DIASTOLIC BLOOD PRESSURE: 72 MMHG | HEART RATE: 72 BPM | OXYGEN SATURATION: 92 % | WEIGHT: 142 LBS | HEIGHT: 63 IN | BODY MASS INDEX: 25.16 KG/M2

## 2025-06-12 PROBLEM — I31.39 PERICARDIAL EFFUSION: Status: ACTIVE | Noted: 2025-06-12

## 2025-06-12 PROBLEM — I71.43 INFRARENAL ABDOMINAL AORTIC ANEURYSM (AAA) WITHOUT RUPTURE: Status: ACTIVE | Noted: 2025-06-12

## 2025-06-12 PROCEDURE — 99214 OFFICE O/P EST MOD 30 MIN: CPT

## 2025-06-12 PROCEDURE — G2211 COMPLEX E/M VISIT ADD ON: CPT

## 2025-06-12 PROCEDURE — 93000 ELECTROCARDIOGRAM COMPLETE: CPT

## 2025-06-12 RX ORDER — BUDESONIDE, GLYCOPYRROLATE, AND FORMOTEROL FUMARATE 160; 9; 4.8 UG/1; UG/1; UG/1
160-9-4.8 AEROSOL, METERED RESPIRATORY (INHALATION)
Refills: 0 | Status: ACTIVE | COMMUNITY

## 2025-06-12 RX ORDER — ALBUTEROL SULFATE 90 UG/1
108 (90 BASE) INHALANT RESPIRATORY (INHALATION) EVERY 4 HOURS
Refills: 0 | Status: ACTIVE | COMMUNITY

## 2025-06-25 ENCOUNTER — APPOINTMENT (OUTPATIENT)
Dept: CARDIOLOGY | Facility: CLINIC | Age: 75
End: 2025-06-25
Payer: MEDICARE

## 2025-06-25 PROCEDURE — 93306 TTE W/DOPPLER COMPLETE: CPT
